# Patient Record
Sex: FEMALE | Race: ASIAN | Employment: FULL TIME | URBAN - METROPOLITAN AREA
[De-identification: names, ages, dates, MRNs, and addresses within clinical notes are randomized per-mention and may not be internally consistent; named-entity substitution may affect disease eponyms.]

---

## 2020-11-11 ENCOUNTER — OFFICE VISIT (OUTPATIENT)
Dept: INTERNAL MEDICINE CLINIC | Age: 30
End: 2020-11-11

## 2020-11-11 VITALS
SYSTOLIC BLOOD PRESSURE: 103 MMHG | RESPIRATION RATE: 18 BRPM | DIASTOLIC BLOOD PRESSURE: 74 MMHG | BODY MASS INDEX: 26.46 KG/M2 | WEIGHT: 158.8 LBS | HEIGHT: 65 IN | TEMPERATURE: 98.4 F | OXYGEN SATURATION: 98 % | HEART RATE: 80 BPM

## 2020-11-11 DIAGNOSIS — R63.5 WEIGHT GAIN: ICD-10-CM

## 2020-11-11 DIAGNOSIS — L70.0 ACNE VULGARIS: Primary | ICD-10-CM

## 2020-11-11 DIAGNOSIS — Z13.220 SCREENING FOR LIPID DISORDERS: ICD-10-CM

## 2020-11-11 DIAGNOSIS — Z23 ENCOUNTER FOR IMMUNIZATION: ICD-10-CM

## 2020-11-11 DIAGNOSIS — L65.9 HAIR LOSS: ICD-10-CM

## 2020-11-11 LAB
ALBUMIN SERPL-MCNC: 3.9 G/DL (ref 3.5–5)
ALBUMIN/GLOB SERPL: 1.1 {RATIO} (ref 1.1–2.2)
ALP SERPL-CCNC: 73 U/L (ref 45–117)
ALT SERPL-CCNC: 32 U/L (ref 12–78)
ANION GAP SERPL CALC-SCNC: 9 MMOL/L (ref 5–15)
AST SERPL-CCNC: 14 U/L (ref 15–37)
BILIRUB SERPL-MCNC: 0.6 MG/DL (ref 0.2–1)
BUN SERPL-MCNC: 7 MG/DL (ref 6–20)
BUN/CREAT SERPL: 9 (ref 12–20)
CALCIUM SERPL-MCNC: 9.3 MG/DL (ref 8.5–10.1)
CHLORIDE SERPL-SCNC: 105 MMOL/L (ref 97–108)
CHOLEST SERPL-MCNC: 176 MG/DL
CO2 SERPL-SCNC: 26 MMOL/L (ref 21–32)
CREAT SERPL-MCNC: 0.79 MG/DL (ref 0.55–1.02)
ERYTHROCYTE [DISTWIDTH] IN BLOOD BY AUTOMATED COUNT: 14.2 % (ref 11.5–14.5)
FERRITIN SERPL-MCNC: 20 NG/ML (ref 8–252)
GLOBULIN SER CALC-MCNC: 3.5 G/DL (ref 2–4)
GLUCOSE SERPL-MCNC: 88 MG/DL (ref 65–100)
HCT VFR BLD AUTO: 41.8 % (ref 35–47)
HDLC SERPL-MCNC: 54 MG/DL
HDLC SERPL: 3.3 {RATIO} (ref 0–5)
HGB BLD-MCNC: 13 G/DL (ref 11.5–16)
LDLC SERPL CALC-MCNC: 101.6 MG/DL (ref 0–100)
LIPID PROFILE,FLP: ABNORMAL
MCH RBC QN AUTO: 24.8 PG (ref 26–34)
MCHC RBC AUTO-ENTMCNC: 31.1 G/DL (ref 30–36.5)
MCV RBC AUTO: 79.8 FL (ref 80–99)
NRBC # BLD: 0 K/UL (ref 0–0.01)
NRBC BLD-RTO: 0 PER 100 WBC
PLATELET # BLD AUTO: 244 K/UL (ref 150–400)
PMV BLD AUTO: 12 FL (ref 8.9–12.9)
POTASSIUM SERPL-SCNC: 4.2 MMOL/L (ref 3.5–5.1)
PROT SERPL-MCNC: 7.4 G/DL (ref 6.4–8.2)
RBC # BLD AUTO: 5.24 M/UL (ref 3.8–5.2)
SODIUM SERPL-SCNC: 140 MMOL/L (ref 136–145)
TRIGL SERPL-MCNC: 102 MG/DL (ref ?–150)
TSH SERPL DL<=0.05 MIU/L-ACNC: 3.01 UIU/ML (ref 0.36–3.74)
VLDLC SERPL CALC-MCNC: 20.4 MG/DL
WBC # BLD AUTO: 8.8 K/UL (ref 3.6–11)

## 2020-11-11 PROCEDURE — 90686 IIV4 VACC NO PRSV 0.5 ML IM: CPT

## 2020-11-11 PROCEDURE — 99203 OFFICE O/P NEW LOW 30 MIN: CPT | Performed by: INTERNAL MEDICINE

## 2020-11-11 RX ORDER — TRETIONIN 0.25 MG/G
GEL TOPICAL
Qty: 15 G | Refills: 2 | Status: SHIPPED | OUTPATIENT
Start: 2020-11-11

## 2020-11-11 RX ORDER — CLINDAMYCIN PHOSPHATE 10 UG/ML
LOTION TOPICAL
Qty: 1 BOTTLE | Refills: 2 | Status: SHIPPED | OUTPATIENT
Start: 2020-11-11

## 2020-11-11 RX ORDER — LANOLIN ALCOHOL/MO/W.PET/CERES
1000 CREAM (GRAM) TOPICAL DAILY
COMMUNITY

## 2020-11-11 RX ORDER — CHOLECALCIFEROL (VITAMIN D3) 125 MCG
CAPSULE ORAL
COMMUNITY

## 2020-11-11 NOTE — PROGRESS NOTES
HPI   Abdiaziz Montanez is a 35 y.o. female, she presents today for:    Living in [place] for 5 years. Born in [facility]    Works as    x2 years ago ( lives in [facility] x1 year). Currently working remote. Has gotten use to things. However working longer hours. PMH: history of frequent urinary tract infections. Other physicians:    - Dr. Rudy Brody    Sleep: no difficulty. Eating: sometimes binging on snacks. Patterns of eating may have. Weight gain: increased since marriage. Exercise: 2-3 times per week. Yoga, and dance classes. LMP: regular. SA with .   25th. Acne increase and hair change. Possible drop in energy. Taking b12 and D3. PMH/PSH: reviewed and updated  Sochx:  reports that she has never smoked. She has never used smokeless tobacco. She reports current alcohol use of about 2.0 standard drinks of alcohol per week. She reports that she does not use drugs. Famhx: reviewed and updated     All: No Known Allergies  Med:   Current Outpatient Medications   Medication Sig    cyanocobalamin (Vitamin B-12) 1,000 mcg tablet Take 1,000 mcg by mouth daily.  cholecalciferol, vitamin D3, 50 mcg (2,000 unit) tab Take  by mouth. No current facility-administered medications for this visit. Review of Systems   Constitutional: Negative for chills, fever and malaise/fatigue. Respiratory: Negative for cough and shortness of breath. Cardiovascular: Negative for chest pain. PE:  Blood pressure 103/74, pulse 80, temperature 98.4 °F (36.9 °C), temperature source Oral, resp. rate 18, height 5' 5\" (1.651 m), weight 158 lb 12.8 oz (72 kg), last menstrual period 10/25/2020, SpO2 98 %. Body mass index is 26.43 kg/m². Physical Exam  Vitals signs and nursing note reviewed. Constitutional:       General: She is not in acute distress. Appearance: Normal appearance. She is normal weight. HENT:      Head: Normocephalic and atraumatic. Right Ear: Tympanic membrane normal.      Left Ear: Tympanic membrane normal.      Nose: Nose normal.      Mouth/Throat:      Mouth: Mucous membranes are moist.   Eyes:      Extraocular Movements: Extraocular movements intact. Conjunctiva/sclera: Conjunctivae normal.      Pupils: Pupils are equal, round, and reactive to light. Neck:      Musculoskeletal: Normal range of motion and neck supple. Cardiovascular:      Rate and Rhythm: Normal rate and regular rhythm. Heart sounds: Normal heart sounds. Pulmonary:      Effort: Pulmonary effort is normal.      Breath sounds: Normal breath sounds. Musculoskeletal: Normal range of motion. Skin:     General: Skin is warm and dry. Neurological:      Mental Status: She is alert and oriented to person, place, and time. Labs:   No results found for any visits on 11/11/20. A/P:  35 y.o. female    ICD-10-CM ICD-9-CM    1. Acne vulgaris  L70.0 706.1 clindamycin (CLEOCIN T) 1 % lotion      tretinoin 0.025 % tpical gel      TESTOSTERONE, TOTAL, FEMALE/CHILD      TSH 3RD GENERATION      METABOLIC PANEL, COMPREHENSIVE      LIPID PANEL      CBC W/O DIFF      FERRITIN      FERRITIN      CBC W/O DIFF      LIPID PANEL      METABOLIC PANEL, COMPREHENSIVE      TSH 3RD GENERATION      TESTOSTERONE, TOTAL, FEMALE/CHILD   2. Weight gain  R63.5 783.1 TESTOSTERONE, TOTAL, FEMALE/CHILD      TSH 3RD GENERATION      METABOLIC PANEL, COMPREHENSIVE      LIPID PANEL      CBC W/O DIFF      FERRITIN      FERRITIN      CBC W/O DIFF      LIPID PANEL      METABOLIC PANEL, COMPREHENSIVE      TSH 3RD GENERATION      TESTOSTERONE, TOTAL, FEMALE/CHILD   3. Hair loss  L65.9 704.00 TESTOSTERONE, TOTAL, FEMALE/CHILD      TSH 3RD GENERATION      METABOLIC PANEL, COMPREHENSIVE      LIPID PANEL      CBC W/O DIFF      FERRITIN      FERRITIN      CBC W/O DIFF      LIPID PANEL      METABOLIC PANEL, COMPREHENSIVE      TSH 3RD GENERATION      TESTOSTERONE, TOTAL, FEMALE/CHILD   4.  Encounter for immunization  Z23 V03.89 INFLUENZA VIRUS VAC QUAD,SPLIT,PRESV FREE SYRINGE IM   5. Screening for lipid disorders  Z13.220 V77.91 LIPID PANEL      LIPID PANEL     Acne:  Increased in adulthood, check testosterone level. Did not tolerate oral abx. Trial topical and gentle topical tretinoin. Hair loss: possible stress related, check tsh and ferritin. Weight gain, new since marriage ~ 2 years ago. Following healthy lifestyle, but increased eating.    - continue healthy choices. - labs to screen for suguar/lipid elevation. Both are present with father. Dyspareunia: has discussed with gyn, advised to work on personal health for stress relief. Provided information for sexual health clinic with francisco j chiang if she would like to review further. Flu vaccine: discussed risks and benefits, answered questions, agreed to vaccine. - She was given AVS and expressed understanding with the diagnosis and plan as discussed. No future appointments.

## 2020-11-11 NOTE — PATIENT INSTRUCTIONS
Acne Medicine: Care Instructions Your Care Instructions Medicines can help acne. They can clean skin pores, kill germs, and reduce skin oil. And they can reduce the effects of hormones. The type of medicine you take depends on the type of acne you have. The best treatment often is a mix of medicines. For example, you might take pills and put medicine on your skin. Common acne medicines include: · Benzoyl peroxide. This includes Benzac or Brevoxyl. · Salicylic acid. This includes Propa pH or Stridex. · Retinoid medicines you put on your skin. These include adapalene (Differin) and tretinoin (Retin-A). · Antibiotic pills or ointments. These include erythromycin and tetracycline. · Some birth control pills. Antibiotics for acne can cause side effects. They include yeast infections (in women) and diarrhea. Let your doctor know if you have side effects. Tell your doctor if you are breastfeeding or if you're pregnant or think you might get pregnant. Some of these medicines are not safe to take when you are pregnant or breastfeeding. Women who take some medicines need to use birth control. Follow-up care is a key part of your treatment and safety. Be sure to make and go to all appointments, and call your doctor if you are having problems. It's also a good idea to know your test results and keep a list of the medicines you take. How can you care for yourself at home? · Take your medicines exactly as prescribed. Call your doctor if you think you are having a problem with your medicine. You will get more details on the specific medicines your doctor prescribes. When should you call for help? Call your doctor now or seek immediate medical care if: 
  · You have signs of an infection, such as: 
? Increased pain, swelling, warmth, and redness. ? Red streaks leading from the affected area. ? Pus draining from the area. ? A fever.   
Watch closely for changes in your health, and be sure to contact your doctor if: 
  · You think you may be having a problem with the medicine.  
  · You do not get better as expected. Where can you learn more? Go to http://www.gray.com/ Enter C526 in the search box to learn more about \"Acne Medicine: Care Instructions. \" Current as of: July 2, 2020               Content Version: 12.6 © 6418-7074 [employer]. Care instructions adapted under license by [employer] (which disclaims liability or warranty for this information). If you have questions about a medical condition or this instruction, always ask your healthcare professional. [place] [address] any warranty or liability for your use of this information.

## 2020-11-11 NOTE — PROGRESS NOTES
RM # 1  Would like to receive the flu vaccine      Chief Complaint   Patient presents with    New Patient     physcial, weight management, hair loss starting 6 months ago., acne of her face     3 most recent PHQ Screens 11/11/2020   Little interest or pleasure in doing things Not at all   Feeling down, depressed, irritable, or hopeless Several days   Total Score PHQ 2 1     1. Have you been to the ER, urgent care clinic since your last visit? Hospitalized since your last visit? No    2. Have you seen or consulted any other health care providers outside of the [address] [place] [address] since your last visit? Include any pap smears or colon screening. No    Health Maintenance Due   Topic Date Due    DTaP/Tdap/Td series (1 - Tdap) 02/12/2012    PAP AKA CERVICAL CYTOLOGY  02/12/2012    Flu Vaccine (1) 09/01/2020       No flowsheet data found. After obtaining consent, and per orders of Dr. Arlene Shafer, injection of Flu Vaccine given by [facility]. Patient instructed to remain in clinic for 20 minutes afterwards, and to report any adverse reaction to me immediately. AVS, education, follow up and recommendations provided and addressed with patient.   services used to advise patient no

## 2020-11-12 NOTE — PROGRESS NOTES
Normal iron, no anemia, normal thyroid function. You have a healthy cholesterol profile and normal fasting glucose. No new concerns/recommendations.     Lab letter generated

## 2020-11-15 LAB — TESTOST SERPL-MCNC: 28 NG/DL (ref 10–55)

## 2022-01-22 ENCOUNTER — APPOINTMENT (OUTPATIENT)
Dept: GENERAL RADIOLOGY | Age: 32
End: 2022-01-22
Attending: EMERGENCY MEDICINE
Payer: COMMERCIAL

## 2022-01-22 ENCOUNTER — HOSPITAL ENCOUNTER (EMERGENCY)
Age: 32
Discharge: LEFT BEFORE DISCHARGE | End: 2022-01-22
Attending: EMERGENCY MEDICINE
Payer: COMMERCIAL

## 2022-01-22 VITALS
BODY MASS INDEX: 27.46 KG/M2 | DIASTOLIC BLOOD PRESSURE: 59 MMHG | SYSTOLIC BLOOD PRESSURE: 108 MMHG | TEMPERATURE: 97.8 F | WEIGHT: 165 LBS | HEART RATE: 57 BPM | RESPIRATION RATE: 18 BRPM | OXYGEN SATURATION: 100 %

## 2022-01-22 DIAGNOSIS — M79.603 PAIN OF UPPER EXTREMITY, UNSPECIFIED LATERALITY: Primary | ICD-10-CM

## 2022-01-22 PROCEDURE — 75810000275 HC EMERGENCY DEPT VISIT NO LEVEL OF CARE

## 2022-01-22 NOTE — ED TRIAGE NOTES
Pt was walking dog and she fell on left side, but then the 40lb dog ran over her right hand/forearm, she states she is unable to move her elbow and she is unable to left from the shoulder due to pain in the forearm

## 2022-05-02 ENCOUNTER — TRANSCRIBE ORDER (OUTPATIENT)
Dept: SCHEDULING | Age: 32
End: 2022-05-02

## 2022-05-02 DIAGNOSIS — S52.201D CLOSED FRACTURE OF SHAFT OF RIGHT RADIUS AND ULNA, WITH ROUTINE HEALING, SUBSEQUENT ENCOUNTER: Primary | ICD-10-CM

## 2022-05-02 DIAGNOSIS — S52.301D CLOSED FRACTURE OF SHAFT OF RIGHT RADIUS AND ULNA, WITH ROUTINE HEALING, SUBSEQUENT ENCOUNTER: Primary | ICD-10-CM

## 2022-05-05 ENCOUNTER — HOSPITAL ENCOUNTER (OUTPATIENT)
Dept: CT IMAGING | Age: 32
Discharge: HOME | End: 2022-05-05
Attending: ORTHOPAEDIC SURGERY
Payer: COMMERCIAL

## 2022-05-05 DIAGNOSIS — S52.301D CLOSED FRACTURE OF SHAFT OF RIGHT RADIUS AND ULNA, WITH ROUTINE HEALING, SUBSEQUENT ENCOUNTER: ICD-10-CM

## 2022-05-05 DIAGNOSIS — S52.201D CLOSED FRACTURE OF SHAFT OF RIGHT RADIUS AND ULNA, WITH ROUTINE HEALING, SUBSEQUENT ENCOUNTER: ICD-10-CM

## 2022-05-05 PROCEDURE — 73200 CT UPPER EXTREMITY W/O DYE: CPT
